# Patient Record
Sex: MALE | Race: WHITE | NOT HISPANIC OR LATINO | Employment: UNEMPLOYED | URBAN - NONMETROPOLITAN AREA
[De-identification: names, ages, dates, MRNs, and addresses within clinical notes are randomized per-mention and may not be internally consistent; named-entity substitution may affect disease eponyms.]

---

## 2018-02-08 DIAGNOSIS — M25.552 LEFT HIP PAIN: Primary | ICD-10-CM

## 2018-02-09 ENCOUNTER — OFFICE VISIT (OUTPATIENT)
Dept: ORTHOPEDIC SURGERY | Facility: CLINIC | Age: 38
End: 2018-02-09

## 2018-02-09 VITALS — HEIGHT: 66 IN | BODY MASS INDEX: 29.84 KG/M2 | WEIGHT: 185.7 LBS

## 2018-02-09 DIAGNOSIS — M25.552 LEFT HIP PAIN: Primary | ICD-10-CM

## 2018-02-09 PROCEDURE — 99202 OFFICE O/P NEW SF 15 MIN: CPT | Performed by: ORTHOPAEDIC SURGERY

## 2018-02-09 RX ORDER — NABUMETONE 750 MG/1
750 TABLET, FILM COATED ORAL 2 TIMES DAILY
Qty: 60 TABLET | Refills: 2 | Status: SHIPPED | OUTPATIENT
Start: 2018-02-09 | End: 2018-04-27 | Stop reason: SDUPTHER

## 2018-02-09 RX ORDER — SIMVASTATIN 10 MG
TABLET ORAL
Refills: 0 | COMMUNITY
Start: 2018-01-25

## 2018-02-09 RX ORDER — INSULIN GLARGINE 100 [IU]/ML
INJECTION, SOLUTION SUBCUTANEOUS
Refills: 0 | COMMUNITY
Start: 2018-01-23

## 2018-02-09 NOTE — PROGRESS NOTES
Eric Staton is a 37 y.o. male   Primary provider:  JOIE Pérez       Chief Complaint   Patient presents with   • Left Hip - Establish Care     Xray done today in office.       HISTORY OF PRESENT ILLNESS:    Hip Injury   This is a new problem. Episode onset: 1/12/18 patient fell on ice. The problem occurs intermittently. Pertinent negatives include no abdominal pain, chest pain, chills, congestion, coughing, diaphoresis, fatigue, fever, headaches, joint swelling, nausea, neck pain, numbness, rash or weakness. Exacerbated by: sitting. He has tried nothing for the symptoms.     Complains of leftward hip pain, the pain is dull, this occurred when he fell on ice.   The pain is worse with ambulation and movement.  + nighttime awakening pain.  No numbness or tingling.       CONCURRENT MEDICAL HISTORY:    Past Medical History:   Diagnosis Date   • Anxiety 8/13/2014   • Backache 6/22/2010   • Carpal tunnel syndrome, left 8/13/2014   • Depressive disorder 6/8/2010   • Diabetes        No Known Allergies      Current Outpatient Prescriptions:   •  Insulin Glargine (BASAGLAR KWIKPEN) 100 UNIT/ML injection pen, , Disp: , Rfl: 0  •  NOVOLOG 100 UNIT/ML injection, inject 16 units subcutaneously three times a day before meals, Disp: , Rfl: 0  •  simvastatin (ZOCOR) 10 MG tablet, , Disp: , Rfl: 0    History reviewed. No pertinent surgical history.    Family History   Problem Relation Age of Onset   • Cancer Other    • Diabetes Other        Social History     Social History   • Marital status:      Spouse name: N/A   • Number of children: N/A   • Years of education: N/A     Occupational History   • Not on file.     Social History Main Topics   • Smoking status: Never Smoker   • Smokeless tobacco: Never Used   • Alcohol use Yes      Comment: 2 per week   • Drug use: No   • Sexual activity: Not on file     Other Topics Concern   • Not on file     Social History Narrative   • No narrative on file        Review  "of Systems   Constitutional: Negative for appetite change, chills, diaphoresis, fatigue, fever and unexpected weight change.   HENT: Negative.  Negative for congestion, dental problem, facial swelling, hearing loss, nosebleeds, postnasal drip, trouble swallowing and voice change.    Eyes: Negative.  Negative for pain, discharge, redness and itching.   Respiratory: Negative.  Negative for cough, choking, chest tightness, shortness of breath, wheezing and stridor.    Cardiovascular: Negative.  Negative for chest pain, palpitations and leg swelling.   Gastrointestinal: Negative.  Negative for abdominal pain, blood in stool, constipation, diarrhea and nausea.   Endocrine: Negative.  Negative for cold intolerance and heat intolerance.   Genitourinary: Negative.  Negative for dysuria, frequency, hematuria and urgency.   Musculoskeletal: Negative.  Negative for gait problem, joint swelling, neck pain and neck stiffness.   Skin: Negative.  Negative for pallor and rash.   Allergic/Immunologic: Negative.    Neurological: Negative for syncope, facial asymmetry, speech difficulty, weakness, numbness and headaches.   Hematological: Negative.    Psychiatric/Behavioral: Negative.  Negative for agitation, behavioral problems, confusion, decreased concentration, dysphoric mood and hallucinations. The patient is not nervous/anxious and is not hyperactive.    All other systems reviewed and are negative.      PHYSICAL EXAMINATION:       Ht 167.6 cm (66\")  Wt 84.2 kg (185 lb 11.2 oz)  BMI 29.97 kg/m2    Physical Exam    GAIT:     []  Normal  []  Antalgic    Assistive device: []  None  []  Walker     []  Crutches  []  Cane     []  Wheelchair  []  Stretcher    Left Hip Exam     Tenderness   The patient is experiencing tenderness in the anterior.    Range of Motion   Flexion: 120   External Rotation: 20   Abduction: 20     Tests   TERENCE: positive    Other   Erythema: absent  Scars: absent  Sensation: normal    Comments:  Negative " [facility] exam                  Xr Hip With Or Without Pelvis 2 - 3 View Left    Result Date: 2/9/2018  Narrative: Xray left hip with standard view projection with AP view and lateral bone quality is good, alignment is normal.  Hip joint space is decreased, very minimal.  Sacroiliac joint space is normal.          ASSESSMENT:    Diagnoses and all orders for this visit:    Left hip pain    Other orders  -     NOVOLOG 100 UNIT/ML injection; inject 16 units subcutaneously three times a day before meals  -     Insulin Glargine (BASAGLAR KWIKPEN) 100 UNIT/ML injection pen;   -     simvastatin (ZOCOR) 10 MG tablet;           PLAN     relafen prescribed today.  I discussed risks of medications.  Discussed activity modifications, symptomatic management.        Enrique Martin MD

## 2018-04-25 DIAGNOSIS — M25.511 RIGHT SHOULDER PAIN, UNSPECIFIED CHRONICITY: Primary | ICD-10-CM

## 2018-04-27 ENCOUNTER — OFFICE VISIT (OUTPATIENT)
Dept: ORTHOPEDIC SURGERY | Facility: CLINIC | Age: 38
End: 2018-04-27

## 2018-04-27 VITALS — HEIGHT: 66 IN | BODY MASS INDEX: 29.57 KG/M2 | WEIGHT: 184 LBS

## 2018-04-27 DIAGNOSIS — M25.511 RIGHT SHOULDER PAIN, UNSPECIFIED CHRONICITY: Primary | ICD-10-CM

## 2018-04-27 PROCEDURE — 99213 OFFICE O/P EST LOW 20 MIN: CPT | Performed by: ORTHOPAEDIC SURGERY

## 2018-04-27 RX ORDER — NABUMETONE 750 MG/1
750 TABLET, FILM COATED ORAL 2 TIMES DAILY
Qty: 60 TABLET | Refills: 2 | Status: SHIPPED | OUTPATIENT
Start: 2018-04-27

## 2018-04-27 NOTE — PROGRESS NOTES
Eric Staton is a 37 y.o. male   Primary provider:  JOIE Pérez       Chief Complaint   Patient presents with   • Right Shoulder - Pain     X-rays done today in office   HISTORY OF PRESENT ILLNESS: patient states he slipped on ice and tried to catch himself hurting his right shoulder 1/12/18 Pain scale today no movement 3/10    Pain   This is a chronic problem. The current episode started more than 1 month ago. The problem occurs constantly. Associated symptoms include joint swelling. Associated symptoms comments: Right shoulder pain    . The symptoms are aggravated by exertion (any movement and lifting ). He has tried NSAIDs and rest for the symptoms. The treatment provided mild relief.     States the hip pain is improved.  He is taking relafen.         CONCURRENT MEDICAL HISTORY:    Past Medical History:   Diagnosis Date   • Anxiety 8/13/2014   • Backache 6/22/2010   • Carpal tunnel syndrome, left 8/13/2014   • Depressive disorder 6/8/2010   • Diabetes        No Known Allergies      Current Outpatient Prescriptions:   •  Insulin Glargine (BASAGLAR KWIKPEN) 100 UNIT/ML injection pen, , Disp: , Rfl: 0  •  nabumetone (RELAFEN) 750 MG tablet, Take 1 tablet by mouth 2 (Two) Times a Day., Disp: 60 tablet, Rfl: 2  •  NOVOLOG 100 UNIT/ML injection, inject 16 units subcutaneously three times a day before meals, Disp: , Rfl: 0  •  simvastatin (ZOCOR) 10 MG tablet, , Disp: , Rfl: 0    No past surgical history on file.    Family History   Problem Relation Age of Onset   • Cancer Other    • Diabetes Other         Social History     Social History   • Marital status:      Spouse name: N/A   • Number of children: N/A   • Years of education: N/A     Occupational History   • Not on file.     Social History Main Topics   • Smoking status: Never Smoker   • Smokeless tobacco: Never Used   • Alcohol use Yes      Comment: 2 per week   • Drug use: No   • Sexual activity: Not on file     Other Topics Concern   •  "Not on file     Social History Narrative   • No narrative on file        Review of Systems   Constitutional: Negative.    HENT: Negative.    Eyes: Negative.    Respiratory: Negative.    Cardiovascular: Negative.    Gastrointestinal: Negative.    Endocrine: Negative.    Genitourinary: Negative.    Musculoskeletal: Positive for joint swelling.        Right shoulder pain    Skin: Negative.    Allergic/Immunologic: Negative.    Neurological: Negative.    Hematological: Negative.    Psychiatric/Behavioral: Negative.    All other systems reviewed and are negative.      PHYSICAL EXAMINATION:       Ht 167.6 cm (66\")   Wt 83.5 kg (184 lb)   BMI 29.70 kg/m²     Physical Exam    GAIT:     []  Normal  []  Antalgic    Assistive device: [x]  None  []  Walker     []  Crutches  []  Cane     []  Wheelchair  []  Stretcher    Right Shoulder Exam     Tenderness   The patient is experiencing tenderness in the acromioclavicular joint.    Range of Motion   Active Abduction: 150   Passive Abduction: 160   Extension: 20   Forward Flexion: 160               Xr Shoulder 2+ View Right    Result Date: 4/27/2018  Narrative: Right shoulder xray anteroposterior and multiple anteroposterior and additional views, bone quality is good, glenoid is normal.  Glenohumeral joint is normal.            ASSESSMENT:    Diagnoses and all orders for this visit:    Right shoulder pain, unspecified chronicity  -     Ambulatory Referral to Physical Therapy Evaluate and treat, Ortho; Heat, Electrotherapy; Iontophoresis; Stretching, ROM; Right    Other orders  -     nabumetone (RELAFEN) 750 MG tablet; Take 1 tablet by mouth 2 (Two) Times a Day.          PLAN    Physical therapy, refill of relafen prescription medication.      Enrique Martin MD    "